# Patient Record
Sex: FEMALE | Race: AMERICAN INDIAN OR ALASKA NATIVE
[De-identification: names, ages, dates, MRNs, and addresses within clinical notes are randomized per-mention and may not be internally consistent; named-entity substitution may affect disease eponyms.]

---

## 2018-08-31 ENCOUNTER — HOSPITAL ENCOUNTER (EMERGENCY)
Dept: HOSPITAL 5 - ED | Age: 53
LOS: 1 days | Discharge: HOME | End: 2018-09-01
Payer: COMMERCIAL

## 2018-08-31 DIAGNOSIS — R07.89: Primary | ICD-10-CM

## 2018-08-31 DIAGNOSIS — R11.2: ICD-10-CM

## 2018-08-31 DIAGNOSIS — Z91.041: ICD-10-CM

## 2018-08-31 LAB
BASOPHILS # (AUTO): 0.1 K/MM3 (ref 0–0.1)
BASOPHILS NFR BLD AUTO: 0.7 % (ref 0–1.8)
BUN SERPL-MCNC: 17 MG/DL (ref 7–17)
BUN/CREAT SERPL: 17 %
CALCIUM SERPL-MCNC: 9.3 MG/DL (ref 8.4–10.2)
EOSINOPHIL # BLD AUTO: 0.5 K/MM3 (ref 0–0.4)
EOSINOPHIL NFR BLD AUTO: 5.9 % (ref 0–4.3)
HCT VFR BLD CALC: 42.3 % (ref 30.3–42.9)
HEMOLYSIS INDEX: 7
HGB BLD-MCNC: 13.6 GM/DL (ref 10.1–14.3)
LYMPHOCYTES # BLD AUTO: 2.5 K/MM3 (ref 1.2–5.4)
LYMPHOCYTES NFR BLD AUTO: 32 % (ref 13.4–35)
MCH RBC QN AUTO: 23 PG (ref 28–32)
MCHC RBC AUTO-ENTMCNC: 32 % (ref 30–34)
MCV RBC AUTO: 72 FL (ref 79–97)
MONOCYTES # (AUTO): 0.6 K/MM3 (ref 0–0.8)
MONOCYTES % (AUTO): 7.8 % (ref 0–7.3)
PLATELET # BLD: 210 K/MM3 (ref 140–440)
RBC # BLD AUTO: 5.9 M/MM3 (ref 3.65–5.03)

## 2018-08-31 PROCEDURE — 93005 ELECTROCARDIOGRAM TRACING: CPT

## 2018-08-31 PROCEDURE — 36415 COLL VENOUS BLD VENIPUNCTURE: CPT

## 2018-08-31 PROCEDURE — C9113 INJ PANTOPRAZOLE SODIUM, VIA: HCPCS

## 2018-08-31 PROCEDURE — 85025 COMPLETE CBC W/AUTO DIFF WBC: CPT

## 2018-08-31 PROCEDURE — 93010 ELECTROCARDIOGRAM REPORT: CPT

## 2018-08-31 PROCEDURE — 85379 FIBRIN DEGRADATION QUANT: CPT

## 2018-08-31 PROCEDURE — 80048 BASIC METABOLIC PNL TOTAL CA: CPT

## 2018-08-31 PROCEDURE — 71045 X-RAY EXAM CHEST 1 VIEW: CPT

## 2018-08-31 PROCEDURE — 84484 ASSAY OF TROPONIN QUANT: CPT

## 2018-08-31 PROCEDURE — 99284 EMERGENCY DEPT VISIT MOD MDM: CPT

## 2018-09-01 VITALS — SYSTOLIC BLOOD PRESSURE: 123 MMHG | DIASTOLIC BLOOD PRESSURE: 66 MMHG

## 2018-09-01 NOTE — XRAY REPORT
FINAL REPORT



EXAM:  XR CHEST 1V AP



HISTORY:  CP 



TECHNIQUE:  A portable upright view the chest was obtained.



FINDINGS:  

The heart size and pulmonary vasculature appear normal. The lungs

are clear. Pleural fluid is not seen. The bones and soft tissues

do not show any acute changes.



IMPRESSION:  

No acute cardiopulmonary process.

## 2018-09-01 NOTE — EMERGENCY DEPARTMENT REPORT
ED Chest Pain HPI





- General


Chief Complaint: Chest Pain


Stated Complaint: CHEST PAIN


Time Seen by Provider: 09/01/18 01:48


Source: patient


Mode of arrival: Wheelchair


Limitations: No Limitations





- History of Present Illness


Initial Comments: 





53-year-old -American female presents to the emergency department with 

complaint of some midsternal chest pain that started earlier this evening.  At 

this point it comes and goes intermittently.  It is associated with some nausea 

and vomiting and some diaphoresis.  Patient says that she's been dealing some 

ear pain for a few days followed by some left thigh and hip pain.  This evening 

was the first time where she says that she finally felt like she was getting 

better and she decided to celebrate by drinking some wine and taking a shot of 

tequila.  Shortly after this she began developing the above mentioned symptoms.

  She tried a acid reflux medication for her symptoms without much relief.  She 

says that the chest discomfort is like a pressure and/or gas pain.  It does not 

radiate.  She denies any shortness of breath, fever.  No recent travel or sick 

contacts at home.  She has a past medical history of palpitations.  She denies 

any tobacco or illicit drug use or abuse.





- Related Data


 Allergies











Allergy/AdvReac Type Severity Reaction Status Date / Time


 


Iodinated Contrast- Oral and Allergy  Unknown Verified 08/31/18 21:16





IV Dye     














Heart Score





- HEART Score


History: Slightly suspicious


EKG: Non-specific


Age: 45-65


Risk factors: No known risk factors


Troponin: < normal limit


HEART Score: 2





- Critical Actions


Critical Actions: 0-3 pts:0.9-1.7%risk of adverse cardiac event.Candidate for 

discharge





ED Review of Systems


ROS: 


Stated complaint: CHEST PAIN


Other details as noted in HPI





Comment: All other systems reviewed and negative


Constitutional: diaphoresis.  denies: chills, fever


Eyes: denies: eye pain, eye discharge, vision change


ENT: denies: ear pain, throat pain


Respiratory: denies: cough, shortness of breath, wheezing


Cardiovascular: chest pain.  denies: edema


Gastrointestinal: nausea, vomiting


Genitourinary: denies: urgency, dysuria, discharge


Musculoskeletal: arthralgia, myalgia


Skin: denies: rash, lesions


Neurological: denies: headache, weakness





ED Past Medical Hx





- Past Medical History


Previous Medical History?: Yes


Additional medical history: cardiac history





- Social History


Smoking Status: Unknown if ever smoked





ED Physical Exam





- General


Limitations: No Limitations





- Other


Other exam information: 





GENERAL: The patient is well-developed well-nourished.


HENT: Normocephalic.  Atraumatic.    Patient has moist mucous membranes.


EYES: Extraocular motions are intact.  Pupils equal reactive to light 

bilaterally.


NECK: Supple.  Trachea is midline.


CHEST/LUNGS: Clear to auscultation.  There is no respiratory distress noted.


HEART/CARDIOVASCULAR: Regular.  There is no tachycardia.  There is no murmur.


ABDOMEN: Abdomen is soft, nontender.  Patient has normal bowel sounds.  There 

is no abdominal distention.


SKIN: Skin is warm and dry.


NEURO: The patient is awake, alert, and oriented.  The patient is cooperative.  

The patient has no focal neurologic deficits.  The patient has normal speech.


MUSCULOSKELETAL: There is no tenderness or deformity.  There is no limitation 

range of motion.  There is no evidence of acute injury.





ED Course


 Vital Signs











  08/31/18 09/01/18 09/01/18





  21:16 01:30 01:42


 


Temperature 97.6 F  


 


Pulse Rate 87  


 


Respiratory  16 





Rate   


 


Blood Pressure 155/86  127/68


 


O2 Sat by Pulse 95  





Oximetry   














  09/01/18 09/01/18 09/01/18





  01:46 02:00 02:15


 


Temperature   


 


Pulse Rate 69 72 69


 


Respiratory 15 17 16





Rate   


 


Blood Pressure 127/68 122/63 123/66


 


O2 Sat by Pulse   





Oximetry   














  09/01/18 09/01/18 09/01/18





  03:54 04:00 04:16


 


Temperature   


 


Pulse Rate  73 69


 


Respiratory 13 15 14





Rate   


 


Blood Pressure 123/66 123/66 123/66


 


O2 Sat by Pulse   





Oximetry   














  09/01/18 09/01/18 09/01/18





  04:30 04:46 05:00


 


Temperature   


 


Pulse Rate 64 65 69


 


Respiratory 14 15 15





Rate   


 


Blood Pressure 123/66 123/66 123/66


 


O2 Sat by Pulse   





Oximetry   














  09/01/18 09/01/18 09/01/18





  05:16 05:30 05:46


 


Temperature   


 


Pulse Rate 70 76 66


 


Respiratory 15 19 14





Rate   


 


Blood Pressure 123/66 123/66 123/66


 


O2 Sat by Pulse   





Oximetry   














  09/01/18 09/01/18 09/01/18





  06:00 06:16 06:30


 


Temperature   


 


Pulse Rate 74 65 67


 


Respiratory 14 12 14





Rate   


 


Blood Pressure 123/66 123/66 123/66


 


O2 Sat by Pulse   





Oximetry   














STEPH score





- Steph Score


Age > 65: (0) No


Aspirin use within the Past 7 Days: (0) No


3 or more CAD Risk Factors: (0) No


2 or more Angina events in past 24 hrs: (1) Yes (if the pain is angina)


Known CAD with more than 50% Stenosis: (0) No


Elevated Cardiac Markers: (0) No


ST Deviation Greater than 0.5mm: (0) No


STEPH Score: 1





ED Medical Decision Making





- Lab Data


Result diagrams: 


 08/31/18 21:25





 08/31/18 21:25





- EKG Data


-: EKG Interpreted by Me


EKG shows normal: sinus rhythm, axis, intervals (prolonged ME interval), QRS 

complexes, ST-T waves


Rate: normal





- EKG Data


When compared to previous EKG there are: previous EKG unavailable


Interpretation: other (sinus rhythm, prolonged ME interval, no ST elevation MI)





- Radiology Data


Radiology results: image reviewed


interpreted by me: 





Chest x-ray does not show any acute process.  There are no pleural effusions, 

obvious pneumonia and there is no pneumothorax.





- Medical Decision Making





Patient presents with some midsternal chest discomfort after she had some wine 

and some tequila earlier today.  EKG does not show any signs of ST elevation MI

, ischemia or dysrhythmia.  Patient's labs have been unremarkable including 

negative troponins 3 and a negative d-dimer.  Chest x-ray does not show any 

acute process.  I spoke with Dr. Acevedo through the [facility] who 

asked for the patient to receive a GI cocktail.  If the patient has resolution 

of her chest discomfort that she could be discharged home and if this does not 

appear to improve her chest pain that they will plan to transfer her to a 

[facility] facility for an observational visit.  The patient did receive Maalox, 

lidocaine and some Protonix and upon reevaluation she is feeling better without 

any chest discomfort.  Therefore the patient will be sent back home to follow-

up with her primary care physician and I have contacted [facility] again and the 

patient will be set up with a follow-up with her cardiologist.  The patient has 

been instructed to return to the emergency department with any return of her 

chest pain or any acute distress.





- Differential Diagnosis


MI, PE, GERD, pneumonia


Critical Care Time: No


Critical care attestation.: 


If time is entered above; I have spent that time in minutes in the direct care 

of this critically ill patient, excluding procedure time.








ED Disposition


Clinical Impression: 


Chest pain


Qualifiers:


 Chest pain type: unspecified Qualified Code(s): R07.9 - Chest pain, unspecified





Disposition: DC-01 TO HOME OR SELFCARE


Is pt being admited?: No


Condition: Stable


Instructions:  Chest Pain (ED)


Additional Instructions: 


Please follow-up with your primary care physician in the next few days.  You 

should receive a call from [facility] with an appointment to follow up with your 

cardiologist.  Return to emergency Department with any worsening of her symptoms

, return of your chest pain, or with any acute distress.


Referrals: 


[facility] [Other] - ASAP


Time of Disposition: 06:54

## 2021-04-06 ENCOUNTER — HOSPITAL ENCOUNTER (OUTPATIENT)
Dept: HOSPITAL 5 - XRAY | Age: 56
Discharge: HOME | End: 2021-04-06
Attending: INTERNAL MEDICINE
Payer: COMMERCIAL

## 2021-04-06 DIAGNOSIS — M48.07: ICD-10-CM

## 2021-04-06 DIAGNOSIS — M47.816: ICD-10-CM

## 2021-04-06 DIAGNOSIS — M25.761: ICD-10-CM

## 2021-04-06 DIAGNOSIS — M47.812: ICD-10-CM

## 2021-04-06 DIAGNOSIS — M17.11: Primary | ICD-10-CM

## 2021-04-06 PROCEDURE — 72100 X-RAY EXAM L-S SPINE 2/3 VWS: CPT

## 2021-04-06 PROCEDURE — 72040 X-RAY EXAM NECK SPINE 2-3 VW: CPT

## 2021-04-06 NOTE — XRAY REPORT
RIGHT KNEE 2 VIEW(S)



INDICATION / CLINICAL INFORMATION: RIGHT KNEE PAIN/BACK PAIN



COMPARISON: None available.

 

FINDINGS:



BONES / JOINT(S): No acute fracture or subluxation. Moderate tricompartment degenerative arthrosis wi
th joint space narrowing and osteophyte formation. No significant joint effusion or intra-articular b
elzbieta.



SOFT TISSUES: No significant abnormality.



ADDITIONAL FINDINGS: None.







Signer Name: BOGDAN Monte MD 

Signed: 4/6/2021 1:53 PM

Workstation Name: [email or web address]

## 2021-04-06 NOTE — XRAY REPORT
LUMBAR SPINE 3 VIEWS



INDICATION / CLINICAL INFORMATION: Back pain.



COMPARISON: Radiographs dated 04/17/15 are not available for comparison.



FINDINGS:



VERTEBRAE: No acute fracture. No significant malalignment.



DISC SPACES / FACET JOINTS:Mild disc space narrowing at L4-5 and L5-S1 with moderate facet spondylosi
s at these levels.



PARASPINAL SOFT TISSUES:No significant abnormality.



ADDITIONAL FINDINGS: None.







Signer Name: BOGDAN Monte MD 

Signed: 4/6/2021 1:55 PM

Workstation Name: [email or web address]

## 2021-04-06 NOTE — XRAY REPORT
CERVICAL SPINE 3 VIEWS



INDICATION / CLINICAL INFORMATION: Neck and back pain.



COMPARISON: Prior radiographs dated 04/17/15 are not available for comparison.



FINDINGS:



VERTEBRAE: No acute fracture. No significant malalignment.



DISC SPACES / FACET JOINTS:Mild/moderate discogenic spondylosis at C3-4 and C5-6. Moderate multilevel
 facet spondylosis especially at C5-6 and C6-7 on the right.



PARASPINAL SOFT TISSUES:No significant abnormality.



ADDITIONAL FINDINGS: None.







Signer Name: BOGDAN Monte MD 

Signed: 4/6/2021 1:45 PM

Workstation Name: [email or web address]